# Patient Record
Sex: MALE | URBAN - METROPOLITAN AREA
[De-identification: names, ages, dates, MRNs, and addresses within clinical notes are randomized per-mention and may not be internally consistent; named-entity substitution may affect disease eponyms.]

---

## 2020-06-10 ENCOUNTER — APPOINTMENT (RX ONLY)
Dept: URBAN - METROPOLITAN AREA CLINIC 105 | Facility: CLINIC | Age: 30
Setting detail: DERMATOLOGY
End: 2020-06-10

## 2020-06-10 DIAGNOSIS — L72.3 SEBACEOUS CYST: ICD-10-CM

## 2020-06-10 PROCEDURE — ? DIAGNOSIS COMMENT

## 2020-06-10 PROCEDURE — 99202 OFFICE O/P NEW SF 15 MIN: CPT

## 2020-06-10 PROCEDURE — ? PRESCRIPTION

## 2020-06-10 PROCEDURE — ? COUNSELING

## 2020-06-10 ASSESSMENT — LOCATION ZONE DERM: LOCATION ZONE: TRUNK

## 2020-06-10 ASSESSMENT — LOCATION DETAILED DESCRIPTION DERM: LOCATION DETAILED: LEFT BUTTOCK

## 2020-06-10 ASSESSMENT — LOCATION SIMPLE DESCRIPTION DERM: LOCATION SIMPLE: LEFT BUTTOCK

## 2020-06-10 NOTE — PROCEDURE: DIAGNOSIS COMMENT
Detail Level: Simple
Comment: Discussed with patient plan is to excise the cyst. To begin course of antibiotics to reduce inflammation and schedule to remove lesion once inflammation has gone down. Advised patient to start benzoyl peroxide wash to prevent bacterial infection to area. No fluid pocket palpable today to drain.

## 2020-06-24 ENCOUNTER — APPOINTMENT (RX ONLY)
Dept: URBAN - METROPOLITAN AREA CLINIC 105 | Facility: CLINIC | Age: 30
Setting detail: DERMATOLOGY
End: 2020-06-24

## 2020-06-24 DIAGNOSIS — L72.3 SEBACEOUS CYST: ICD-10-CM | Status: IMPROVED

## 2020-06-24 PROCEDURE — 99213 OFFICE O/P EST LOW 20 MIN: CPT

## 2020-06-24 PROCEDURE — ? DIAGNOSIS COMMENT

## 2020-06-24 PROCEDURE — ? PRESCRIPTION

## 2020-06-24 PROCEDURE — ? COUNSELING

## 2020-06-24 RX ORDER — TRIAMCINOLONE ACETONIDE 1 MG/G
CREAM TOPICAL
Qty: 1 | Refills: 2 | Status: ERX | COMMUNITY
Start: 2020-06-24

## 2020-06-24 RX ADMIN — TRIAMCINOLONE ACETONIDE: 1 CREAM TOPICAL at 00:00

## 2020-06-24 ASSESSMENT — LOCATION DETAILED DESCRIPTION DERM: LOCATION DETAILED: LEFT BUTTOCK

## 2020-06-24 ASSESSMENT — LOCATION ZONE DERM: LOCATION ZONE: TRUNK

## 2020-06-24 ASSESSMENT — LOCATION SIMPLE DESCRIPTION DERM: LOCATION SIMPLE: LEFT BUTTOCK

## 2020-06-24 NOTE — PROCEDURE: DIAGNOSIS COMMENT
Detail Level: Simple
Comment: Patient prefers to avoid excision due to location. Discussed kenalog injections as a non surgical option. Also recommended topical steroids as an at home treatment and otc benzoyl peroxide body wash.